# Patient Record
Sex: FEMALE | Race: WHITE | Employment: OTHER | URBAN - METROPOLITAN AREA
[De-identification: names, ages, dates, MRNs, and addresses within clinical notes are randomized per-mention and may not be internally consistent; named-entity substitution may affect disease eponyms.]

---

## 2017-01-05 ENCOUNTER — OFFICE VISIT (OUTPATIENT)
Dept: ENDOCRINOLOGY | Age: 77
End: 2017-01-05

## 2017-01-05 VITALS
DIASTOLIC BLOOD PRESSURE: 81 MMHG | HEART RATE: 75 BPM | SYSTOLIC BLOOD PRESSURE: 177 MMHG | WEIGHT: 162.4 LBS | HEIGHT: 61 IN | BODY MASS INDEX: 30.66 KG/M2

## 2017-01-05 DIAGNOSIS — E89.0 POSTABLATIVE HYPOTHYROIDISM: ICD-10-CM

## 2017-01-05 DIAGNOSIS — I10 ESSENTIAL HYPERTENSION, BENIGN: ICD-10-CM

## 2017-01-05 DIAGNOSIS — E78.5 HYPERLIPIDEMIA WITH TARGET LDL LESS THAN 100: ICD-10-CM

## 2017-01-05 RX ORDER — AMLODIPINE BESYLATE 10 MG/1
10 TABLET ORAL DAILY
Qty: 30 TAB | Refills: 11 | Status: SHIPPED | OUTPATIENT
Start: 2017-01-05

## 2017-01-05 RX ORDER — INSULIN ASPART 100 [IU]/ML
INJECTION, SOLUTION INTRAVENOUS; SUBCUTANEOUS
Qty: 5 PEN | Refills: 11 | Status: SHIPPED | OUTPATIENT
Start: 2017-01-05 | End: 2017-01-24 | Stop reason: SDUPTHER

## 2017-01-05 RX ORDER — INSULIN ASPART 100 [IU]/ML
INJECTION, SOLUTION INTRAVENOUS; SUBCUTANEOUS
Qty: 1 PEN | Refills: 0 | Status: SHIPPED | COMMUNITY
Start: 2017-01-05 | End: 2017-01-05 | Stop reason: SDUPTHER

## 2017-01-05 RX ORDER — PEN NEEDLE, DIABETIC 31 GX3/16"
NEEDLE, DISPOSABLE MISCELLANEOUS
Qty: 200 PEN NEEDLE | Refills: 11 | Status: SHIPPED | OUTPATIENT
Start: 2017-01-05 | End: 2017-10-20 | Stop reason: SDUPTHER

## 2017-01-05 RX ORDER — INSULIN ASPART 100 [IU]/ML
INJECTION, SOLUTION INTRAVENOUS; SUBCUTANEOUS
Qty: 1 PEN | Refills: 0 | Status: SHIPPED | COMMUNITY
Start: 2017-01-05 | End: 2017-01-24 | Stop reason: SDUPTHER

## 2017-01-05 NOTE — PROGRESS NOTES
Chief Complaint   Patient presents with    Diabetes     History of Present Illness: Riddhi Dunlap is a 68 y.o. female here for follow up of diabetes. Weight up 5 lbs since last visit in 12/16. Was taking the amlodipine everyday but ran out 2 days ago. Home BP readings have been in the 155-180 range despite taking the amlodipine everyday. Has had a few diastolic readings over 201 where she took the clonidine. Had one reading of 201/104 in the middle of the night. She has been compliant with the toujeo and novolog but despite taking these as directed, her fasting sugars are typically over 200. Can sometimes drop under 100 at lunch and feel shaky and sweaty. Tend to stay over 200 with some over 300 at dinner and bedtime. Current Outpatient Prescriptions   Medication Sig    cloNIDine HCl (CATAPRES) 0.1 mg tablet TAKE 1/2 TABLET BY MOUTH TWICE A DAY only if diastolic BP > 808    ALPRAZolam (XANAX) 0.25 mg tablet Take 1 tab at bedtime    levothyroxine (SYNTHROID) 112 mcg tablet Take 112 mcg by mouth daily. 1 tab twice daily    ACCU-CHEK SMARTVIEW TEST STRIP strip Test 3 times daily    clindamycin (CLEOCIN) 300 mg capsule take 2 capsules by mouth 1 HOUR BEFORE DENTAL PROCEDURE    amLODIPine (NORVASC) 5 mg tablet Take 5 mg by mouth daily.  insulin glargine (TOUJEO SOLOSTAR) 300 unit/mL (1.5 mL) inpn 20 Units by SubCUTAneous route nightly.  insulin aspart (NOVOLOG) 100 unit/mL inpn Inject 6 units before meals + 1 units for every 50 mg/dl above 180 mg/dl    metoprolol (LOPRESSOR) 50 mg tablet Take 50 mg by mouth two (2) times a day.  metformin (GLUCOPHAGE) 1,000 mg tablet Take 500 mg by mouth two (2) times a day. No current facility-administered medications for this visit.       Allergies   Allergen Reactions    Demerol [Meperidine] Hives    Penicillins Hives    Percocet [Oxycodone-Acetaminophen] Other (comments)     hallucinating    Simvastatin Nausea Only     Review of Systems: Not asked today    Physical Examination:  Blood pressure 177/81, pulse 75, height 5' 1\" (1.549 m), weight 162 lb 6.4 oz (73.7 kg). - General: pleasant, no distress, good eye contact   - Full exam not performed  - Psychiatric: normal mood and affect    Data Reviewed:   - none new for review    Assessment/Plan:     1. Uncontrolled type 2 diabetes mellitus without complication, with long-term current use of insulin ([address]): her most recent Hgb A1c was 10.6% in 12/16 up from 8.8% in 10/15. 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 We spent 40 minutes of face to face time together and > 50% of the time was spent in counseling on diabetes management and determining what changes to make to her insulin regimen. I think she needs more toujeo and slightly more novolog with meals and also needs a bedtime scale to help correct for highs. Reviewed all of these instructions with pt and her daughter.  - cont metformin 1g 1/2 tab bid  - increase toujeo 22 units at bedtime  - increase novolog to 7 units before each meal + 1 units for every 50 mg/dl above 180 mg/dl  - check bs 4 times daily due to fluctuating sugars  - foot exam done 12/16  - optho UTD 4/16--will obtain report       2. Essential hypertension, benign: her BP was above goal < 140/90 so will increase the amlodipine.  - increase amlodipine to 10 mg daily  - cont metoprolol 50 mg bid  - cont clonidine 0.1 mg 1/2 tab bid prn diastolic bp > 219      3. Hyperlipidemia with target LDL less than 100:  in 10/15 and 73 in 12/16  - diet control for now  - check lipids in 8 months     4. Postablative hypothyroidism: TSH was 0.91 in 10/15 on levothyroxine 112 mcg daily and 3.23 in 12/16  - cont levothyroxine 112 mcg daily  - check TSH and free T4 in 8 months          Patient Instructions   1) Increase toujeo (light gray/green pen) to 22 units at bedtime.   This is a long acting insulin. You will take this everyday regardless of your blood sugar. 2) Increase novolog (dark blue/orange pen) 5-10 minutes before you eat during the day your meal based on your blood sugar as listed on the scale below:  Blood sugar            Less than 100  Eat first, take 6 units    100-180  7 units    181-230  8 units      231-280  9 units     281-330  10 units      331-380  11 units      381-430  12 units  431-480  13 units  481-530  14 units    3) If you check your sugar at bedtime and your sugar is over 180, take the 22 units of toujeo PLUS a dose of novolog based on the scale below. 181-230  1 units      231-280  2 units     281-330  3 units      331-380  4 units      381-430  5 units  431-480  6 units  481-530  7 units    4) If you are following the scale for meals for novolog and are having more low sugars between breakfast and lunch, try taking 1 unit less than what's on the scale. 5) Fax me readings in 1 week to [phone].    6) Let me know when you are going to be coming back to [place] and we'll see if we can coordinate your visit. 7) I sent prescriptions for toujeo and novolog and pen needles to the pharmacy. There's a chance that your insurance requires different insulins but we'll address that if needed. Follow-up Disposition:  Return for to be determine upon when she comes back to see Dr. Reg Zamora.     Copy sent to:  Dr. Ryan Davis

## 2017-01-05 NOTE — PATIENT INSTRUCTIONS
1) Increase toujeo (light gray/green pen) to 22 units at bedtime. This is a long acting insulin. You will take this everyday regardless of your blood sugar. 2) Increase novolog (dark blue/orange pen) 5-10 minutes before you eat during the day your meal based on your blood sugar as listed on the scale below:  Blood sugar            Less than 100  Eat first, take 6 units    100-180  7 units    181-230  8 units      231-280  9 units     281-330  10 units      331-380  11 units      381-430  12 units  431-480  13 units  481-530  14 units    3) If you check your sugar at bedtime and your sugar is over 180, take the 22 units of toujeo PLUS a dose of novolog based on the scale below. 181-230  1 units      231-280  2 units     281-330  3 units      331-380  4 units      381-430  5 units  431-480  6 units  481-530  7 units    4) If you are following the scale for meals for novolog and are having more low sugars between breakfast and lunch, try taking 1 unit less than what's on the scale. 5) Fax me readings in 1 week to [phone].    6) Let me know when you are going to be coming back to [facility] and we'll see if we can coordinate your visit. 7) I sent prescriptions for toujeo and novolog and pen needles to the pharmacy. There's a chance that your insurance requires different insulins but we'll address that if needed.

## 2017-01-05 NOTE — MR AVS SNAPSHOT
Visit Information Date & Time Provider Department Dept. Phone Encounter #  
 1/5/2017 12:30 PM Javier Orantes, [address] [facility] Diabetes and Endocrinology [id number] Follow-up Instructions Return for to be determine upon when she comes back to see Dr. Ele Montanez. Upcoming Health Maintenance Date Due MICROALBUMIN Q1 7/4/1950 EYE EXAM RETINAL OR DILATED Q1 7/4/1950 DTaP/Tdap/Td series (1 - Tdap) 7/4/1961 ZOSTER VACCINE AGE 60> 7/4/2000 GLAUCOMA SCREENING Q2Y 7/4/2005 OSTEOPOROSIS SCREENING (DEXA) 7/4/2005 MEDICARE YEARLY EXAM 7/4/2005 Pneumococcal 65+ Low/Medium Risk (2 of 2 - PPSV23) 9/2/2015 INFLUENZA AGE 9 TO ADULT 8/1/2016 HEMOGLOBIN A1C Q6M 6/8/2017 FOOT EXAM Q1 12/8/2017 LIPID PANEL Q1 12/8/2017 Allergies as of 1/5/2017  Review Complete On: 1/5/2017 By: Javier Orantes MD  
  
 Severity Noted Reaction Type Reactions Demerol [Meperidine] Medium 09/02/2010   Topical Hives Penicillins Medium 09/02/2010   Topical Hives Percocet [Oxycodone-acetaminophen]  12/08/2016    Other (comments)  
 hallucinating Simvastatin  01/05/2011    Nausea Only Current Immunizations  Never Reviewed Name Date Pneumococcal Vaccine (Unspecified Type) 9/2/2010  3:41 PM  
  
 Not reviewed this visit Vitals BP Pulse Height(growth percentile) Weight(growth percentile) BMI Smoking Status 177/81 (BP 1 Location: Left arm, BP Patient Position: Sitting) 75 5' 1\" (1.549 m) 162 lb 6.4 oz (73.7 kg) 30.69 kg/m2 Former Smoker BMI and BSA Data Body Mass Index Body Surface Area  
 30.69 kg/m 2 1.78 m 2 Preferred Pharmacy Pharm[facility] Name Phone [facility] [phone] [facility] [address] [phone] Your Updated Medication List  
  
   
This list is accurate as of: 1/5/17 12:56 PM.  Always use your most recent med list.  
  
  
  
  
 ACCU-CHEK SMARTVIEW TEST STRIP strip Generic drug:  glucose blood VI test strips Test 3 times daily ALPRAZolam 0.25 mg tablet Commonly known as:  Jedard Lennox Take 1 tab at bedtime  
  
 amLODIPine 5 mg tablet Commonly known as:  Byron Jimena Take 5 mg by mouth daily. clindamycin 300 mg capsule Commonly known as:  CLEOCIN  
take 2 capsules by mouth 1 HOUR BEFORE DENTAL PROCEDURE  
  
 cloNIDine HCl 0.1 mg tablet Commonly known as:  CATAPRES  
TAKE 1/2 TABLET BY MOUTH TWICE A DAY only if diastolic BP > 518  
  
 * insulin aspart 100 unit/mL Inpn Commonly known as:  Vanessa Listen Inject 7 units before meals + 1 units for every 50 mg/dl above 180 mg/dl--may substitute humalog if preferred * insulin aspart 100 unit/mL Inpn Commonly known as:  NOVOLOG  
sample  
  
 insulin glargine 300 unit/mL (1.5 mL) Inpn Commonly known as:  TOUJEO SOLOSTAR  
22 Units by SubCUTAneous route nightly. Insulin Needles (Disposable) 31 gauge x 3/16\" Ndle Commonly known as:  BD INSULIN PEN NEEDLE UF MINI Use as directed up to 6 times daily  
  
 levothyroxine 112 mcg tablet Commonly known as:  SYNTHROID Take 112 mcg by mouth daily. 1 tab twice daily  
  
 metFORMIN 1,000 mg tablet Commonly known as:  GLUCOPHAGE Take 500 mg by mouth two (2) times a day. metoprolol tartrate 50 mg tablet Commonly known as:  LOPRESSOR Take 50 mg by mouth two (2) times a day. * Notice: This list has 2 medication(s) that are the same as other medications prescribed for you. Read the directions carefully, and ask your doctor or other care provider to review them with you. Prescriptions Sent to Pharmacy Refills  
 insulin glargine (TOUJEO SOLOSTAR) 300 unit/mL (1.5 mL) inpn 11 Si Units by SubCUTAneous route nightly. Class: Normal  
 Pharmacy: [place] [address] [address] #: [phone] Route: SubCUTAneous  Insulin Needles, Disposable, (BD INSULIN PEN NEEDLE UF MINI) 31 gauge x 3/16\" ndle 11 Sig: Use as directed up to 6 times daily Class: Normal  
 [facility] [place] [address] [facility] [address]  #: [phone]  
 insulin aspart (NOVOLOG) 100 unit/mL inpn 11 Sig: Inject 7 units before meals + 1 units for every 50 mg/dl above 180 mg/dl--may substitute humalog if preferred Class: Normal  
 [facility] [id number] [facility] Ph #: [phone] Follow-up Instructions Return for to be determine upon when she comes back to see Dr. Adan Colunga. Patient Instructions 1) Increase toujeo (light gray/green pen) to 22 units at bedtime. This is a long acting insulin. You will take this everyday regardless of your blood sugar. 2) Increase novolog (dark blue/orange pen) 5-10 minutes before you eat during the day your meal based on your blood sugar as listed on the scale below: 
Blood sugar Less than 100  Eat first, take 6 units 100-180  7 units 181-230  8 units 231-280  9 units 281-330  10 units 331-380  11 units 381-430  12 units 431-480  13 units 481-530  14 units 3) If you check your sugar at bedtime and your sugar is over 180, take the 22 units of toujeo PLUS a dose of novolog based on the scale below. 181-230  1 units 231-280  2 units 281-330  3 units 331-380  4 units 381-430  5 units 431-480  6 units 481-530  7 units 4) If you are following the scale for meals for novolog and are having more low sugars between breakfast and lunch, try taking 1 unit less than what's on the scale. 5) Fax me readings in 1 week to [phone]. 
 
6) Let me know when you are going to be coming back to [place] and we'll see if we can coordinate your visit. 7) I sent prescriptions for toujeo and novolog and pen needles to the pharmacy. There's a chance that your insurance requires different insulins but we'll address that if needed. Introducing [facility] & HE[facility]! Leonel Cohen introduces [employer] patient portal. Now you can access parts of your medical record, email your doctor's office, and request medication refills online. 1. In your internet browser, go to [email or web address] 2. Click on the First Time User? Click Here link in the Sign In box. You will see the New Member Sign Up page. 3. Enter your [employer] Access Code exactly as it appears below. You will not need to use this code after youve completed the sign-up process. If you do not sign up before the expiration date, you must request a new code. · [employer] Access Code: [email or web address] Expires: 3/8/2017  1:22 PM 
 
4. Enter the last four digits of your Social Security Number (xxxx) and Date of Birth (mm/dd/yyyy) as indicated and click Submit. You will be taken to the next sign-up page. 5. Create a [employer] ID. This will be your [employer] login ID and cannot be changed, so think of one that is secure and easy to remember. 6. Create a [employer] password. You can change your password at any time. 7. Enter your Password Reset Question and Answer. This can be used at a later time if you forget your password. 8. Enter your e-mail address. You will receive e-mail notification when new information is available in [address] 9. Click Sign Up. You can now view and download portions of your medical record. 10. Click the Download Summary menu link to download a portable copy of your medical information. If you have questions, please visit the Frequently Asked Questions section of the [employer] website. Remember, [employer] is NOT to be used for urgent needs. For medical emergencies, dial 911. Now available from your iPhone and Android! Please provide this summary of care documentation to your next provider. Your primary care clinician is listed as Rand Kent. If you have any questions after today's visit, please call [phone].

## 2017-01-06 ENCOUNTER — TELEPHONE (OUTPATIENT)
Dept: ENDOCRINOLOGY | Age: 77
End: 2017-01-06

## 2017-01-11 ENCOUNTER — TELEPHONE (OUTPATIENT)
Dept: ENDOCRINOLOGY | Age: 77
End: 2017-01-11

## 2017-01-16 ENCOUNTER — TELEPHONE (OUTPATIENT)
Dept: ENDOCRINOLOGY | Age: 77
End: 2017-01-16

## 2017-01-16 NOTE — TELEPHONE ENCOUNTER
----- Message from Lena Kahn MD sent at 1/15/2017  9:08 AM EST -----  Please call her and let her know I received her fax with her blood sugars. They are improving but she is still having frequent spikes over 200. I would like her to increase her toujeo to 25 units at night and keep her doses of novolog the same and give me another update in one week.  -----    Pt notified of above note per Dr. Susan Mosquera and voiced understanding of what was read to her.

## 2017-01-24 ENCOUNTER — TELEPHONE (OUTPATIENT)
Dept: ENDOCRINOLOGY | Age: 77
End: 2017-01-24

## 2017-01-24 RX ORDER — INSULIN ASPART 100 [IU]/ML
INJECTION, SOLUTION INTRAVENOUS; SUBCUTANEOUS
Qty: 5 PEN | Refills: 11
Start: 2017-01-24 | End: 2018-01-17 | Stop reason: SDUPTHER

## 2017-01-24 NOTE — TELEPHONE ENCOUNTER
----- Message from Basia Burnette MD sent at 1/19/2017 10:06 PM EST -----  Let her know I received her fax with her sugars and her readings are still running too high. Have her increase her toujeo to 28 units at bedtime and also increase her novolog dose by 1 unit from what is listed on her scale for the before eating scale and give me an update over fax in one week. ---     Received: 5 days ago       Basia Burnette MD  Isaias Falconarch                     Let her know I received her fax with her sugars and her readings are still running too high.  Have her increase her toujeo to 28 units at bedtime and also increase her novolog dose by 1 unit from what is listed on her scale for the before eating scale and give me an update over fax in one week.              Comments        1/20 lmtrc x1 with sister. 1/20 returned patient call     Pt notified of above note per Dr. Angela Cifuentes and voiced understanding of what was read to her.

## 2017-02-02 ENCOUNTER — TELEPHONE (OUTPATIENT)
Dept: ENDOCRINOLOGY | Age: 77
End: 2017-02-02

## 2017-02-02 NOTE — TELEPHONE ENCOUNTER
She did this exactly correctly and she should never hold the toujeo. If her sugar is under 100, she should drink 4 oz of juice or milk and repeat 15-20 minutes later to ensure it's over 100 and then go ahead and take the toujeo.

## 2017-02-02 NOTE — TELEPHONE ENCOUNTER
Comments        1/30 called pt no answer and no voicemail came on.         ----     I called patient and she voiced understanding however she stated that last night at 8:44 pm her sugar dropped to 74. She stated she ate some crackers, peanut butter, milk and peppermint to bring it up. She stated she rechecked it at 9:19 pm and it was 138. Patient then took her 28 units of Toujeo. She would like to know was that the right thing to do and if it happens again she still take the toujeo. I also told patient that I tried to reach her but no voicemail came on. She stated that her machine was off but she turned it on while I was on the phone with her.

## 2017-02-02 NOTE — TELEPHONE ENCOUNTER
----- Message from Kylie Dos Santos MD sent at 1/29/2017 12:51 PM EST -----  Please let her know I received her fax and her sugars look a lot better on 28 of toujeo and the extra 1 unit of novolog from what her current scale says. Now that her sugars are staying under 200, she won't need to keep faxing me readings and she can just bring a list of her sugars to her next visit in April.

## 2017-02-09 ENCOUNTER — TELEPHONE (OUTPATIENT)
Dept: ENDOCRINOLOGY | Age: 77
End: 2017-02-09

## 2017-02-09 NOTE — TELEPHONE ENCOUNTER
Yes.  Please follow the new novolog scale but just take the decreased dose of toujeo 26 units at night.

## 2017-02-09 NOTE — TELEPHONE ENCOUNTER
Pt notified of message per Dr. Tim Gan and voiced understanding of what was read to her. FYI. .  Pt stated that before lunch her sugar dropped to 65 but after she ate it went up to 105. She would like to know if she should still keep the novolog if she should need it.

## 2017-02-09 NOTE — TELEPHONE ENCOUNTER
Please clarify what she is doing with the novolog as I have not stopped this. If her sugar is under 100, she is supposed to eat first and take 1 unit less novolog. Hopefully the lower dose of toujeo will limit the low sugars she has been having.

## 2017-02-09 NOTE — TELEPHONE ENCOUNTER
Clarification:   Patient stated that she is taking the Novolog as directed  however she wanted to know if she should should still follow the new scale that gave her the increase.

## 2017-02-09 NOTE — TELEPHONE ENCOUNTER
----- Message from Harry Litten sent at 2/9/2017 10:54 AM EST -----  Regarding: Patient call  Patient stated that she would like a call back. It is regarding her sugar dropping in the mornings. This morning it was 82 and patient stated that she started shaking. She can be reached at either number [phone] or [phone]. Thank you.        Yudith Thorpe

## 2017-02-09 NOTE — TELEPHONE ENCOUNTER
Pt stated that for the past 3 mornings her sugar has been low in the am which causes her to shake. She stated that her numbers were 72,80 and this am it was 82. Patient stated she drank some milk and ate something and her sugar  goes back up. She stated that her sugar's have been okay with the highest reading being 174. Pt stated that she eats dinner at 5:30 pm and sometimes a snack consisting of celery and crackers.

## 2017-02-09 NOTE — TELEPHONE ENCOUNTER
Have her decrease her toujeo to 26 units starting tonight to see if this helps keep her morning sugars between  and update me in 1 week if this is not the case.

## 2017-04-18 ENCOUNTER — OFFICE VISIT (OUTPATIENT)
Dept: ENDOCRINOLOGY | Age: 77
End: 2017-04-18

## 2017-04-18 VITALS
HEIGHT: 61 IN | WEIGHT: 156.6 LBS | DIASTOLIC BLOOD PRESSURE: 98 MMHG | HEART RATE: 71 BPM | SYSTOLIC BLOOD PRESSURE: 181 MMHG | BODY MASS INDEX: 29.57 KG/M2

## 2017-04-18 DIAGNOSIS — E89.0 POSTABLATIVE HYPOTHYROIDISM: ICD-10-CM

## 2017-04-18 DIAGNOSIS — I10 ESSENTIAL HYPERTENSION, BENIGN: ICD-10-CM

## 2017-04-18 DIAGNOSIS — E78.5 HYPERLIPIDEMIA WITH TARGET LDL LESS THAN 100: ICD-10-CM

## 2017-04-18 LAB — HBA1C MFR BLD HPLC: 9 %

## 2017-04-18 NOTE — PROGRESS NOTES
Chief Complaint   Patient presents with    Diabetes     pcp and pharmacy confirmed     History of Present Illness: Amna Perez is a 68 y.o. female here for follow up of diabetes. Weight down 6 lbs since last visit in 1/17. Has been taking 26 units of toujeo since 2/9/17 along with the novolog scale. Saw Dr. Donavon Spatz this morning and her cancer is improving and doesn't need any other treatment at this time. Her sugars had been doing better with the majority of her readings under 200 when we last spoke with her in the beginning of 2/17 but since then has had much higher readings consistently in the 200s with some over 300s the month of March. Admits that a lot of this is due to eating higher carb foods and some is due to not taking her novolog when she eats as she doesn't bring her pen and her meter with her when she is away from home. Current Outpatient Prescriptions   Medication Sig    insulin glargine (TOUJEO SOLOSTAR) 300 unit/mL (1.5 mL) inpn 26 Units by SubCUTAneous route nightly. Dose change 2/9/17--updated med list--did not send prescription to the pharmacy    insulin aspart (NOVOLOG) 100 unit/mL inpn Inject 8 units before meals + 1 units for every 50 mg/dl above 180 mg/dl--may substitute humalog if preferred--Dose change 1/24/17--updated med list--did not send prescription to the pharmacy    Insulin Needles, Disposable, (BD INSULIN PEN NEEDLE UF MINI) 31 gauge x 3/16\" ndle Use as directed up to 6 times daily    amLODIPine (NORVASC) 10 mg tablet Take 1 Tab by mouth daily.  cloNIDine HCl (CATAPRES) 0.1 mg tablet TAKE 1/2 TABLET BY MOUTH TWICE A DAY only if diastolic BP > 246    ALPRAZolam (XANAX) 0.25 mg tablet Take 1 tab at bedtime    levothyroxine (SYNTHROID) 112 mcg tablet Take 112 mcg by mouth daily. 1 tab twice daily    ACCU-CHEK SMARTVIEW TEST STRIP strip Test 3 times daily    metoprolol (LOPRESSOR) 50 mg tablet Take 50 mg by mouth two (2) times a day.     metformin (GLUCOPHAGE) 1,000 mg tablet Take 500 mg by mouth two (2) times a day.  clindamycin (CLEOCIN) 300 mg capsule take 2 capsules by mouth 1 HOUR BEFORE DENTAL PROCEDURE     No current facility-administered medications for this visit. Allergies   Allergen Reactions    Demerol [Meperidine] Hives    Penicillins Hives    Percocet [Oxycodone-Acetaminophen] Other (comments)     hallucinating    Simvastatin Nausea Only     Review of Systems: Not asked today    Physical Examination:  Blood pressure (!) 181/98, pulse 71, height 5' 1\" (1.549 m), weight 156 lb 9.6 oz (71 kg). - General: pleasant, no distress, good eye contact   - Full exam not performed  - Psychiatric: normal mood and affect    Data Reviewed:   Component      Latest Ref Rng & Units 4/18/2017           5:05 PM   Hemoglobin A1c (POC)      % 9.0       Assessment/Plan:     1. Uncontrolled type 2 diabetes mellitus without complication, with long-term current use of insulin ([facility] [address]): her most recent Hgb A1c was 9% in 4/17 down from 10.6% in 12/16 up from 8.8% in 10/15. 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 We spent 25 minutes of face to face time together and > 50% of the time was spent in counseling on diabetes management and determining what changes to make to her insulin regimen. She has evidence that her sugars run well when taking all of her insulin as directed and with not overdoing it on carbs. Will not make changes to her doses but did give her a plan of what to do if she eats away from home and doesn't dose her insulin before the meals. - cont metformin 1g 1/2 tab bid  - cont toujeo 26 units at bedtime  - cont novolog 8 units before each meal + 1 units for every 50 mg/dl above 180 mg/dl  - check bs 4 times daily due to fluctuating sugars  - foot exam done 12/16  - optho UTD 3/17      2.  Essential hypertension, benign: her BP was above goal < 140/90 but didn't repeat manually. BP was 140/84 last month at Dr. Po Dejesus office per review of his note remotely through our shared EMR. - cont amlodipine 10 mg daily  - cont metoprolol 50 mg bid  - cont clonidine 0.1 mg 1/2 tab bid prn diastolic bp > 569      3. Hyperlipidemia with target LDL less than 100:  in 10/15 and 73 in 12/16  - diet control for now  - check lipids at next visit     4. Postablative hypothyroidism: TSH was 0.91 in 10/15 on levothyroxine 112 mcg daily and 3.23 in 12/16  - cont levothyroxine 112 mcg daily  - check TSH and free T4 at next visit          Patient Instructions   1) Continue toujeo (light gray/green pen) 26 units at bedtime. This is a long acting insulin. You will take this everyday regardless of your blood sugar. 2) Take novolog (dark blue/orange pen) 5-10 minutes before you eat during the day your meal based on your blood sugar as listed on the scale below:  I updated this scale so you don't need to take more than what's on this scale. Blood sugar            Less than 100  Eat first, take 7 units    100-180  8 units    181-230  9 units      231-280  10 units     281-330  11 units      331-380  12 units      381-430  13 units  431-480  14 units  481-530  15 units    3) If you check your sugar at bedtime and your sugar is over 180, take the 26 units of toujeo PLUS a dose of novolog based on the scale below. 181-230  1 units      231-280  2 units     281-330  3 units      331-380  4 units      381-430  5 units  431-480  6 units  481-530  7 units    4) If you don't take the novolog before you eat, and it's been within 2 hours of the meal, check your sugar when you get home and take a dose of novolog based on the EATING scale but TAKE 2 UNITS LESS. If it's time for the next meal, just take your dose based on what the scale says.     5) Your Hemoglobin A1c (3 month test of blood sugar) was 9% down from 10.6% and likely would have been lower had your sugars not been so high from March. 6) Let me know when you are seeing Dr. Oscar Ponce next and we'll coordinate your visit. Follow-up Disposition:  Return for based on when you see Dr. Oscar borden.     Copy sent to:  Dr. Carlos Kumar

## 2017-04-18 NOTE — MR AVS SNAPSHOT
Visit Information Date & Time Provider Department Dept. Phone Encounter #  
 4/18/2017  4:30 PM Alesha Currie, [address] [place] [address] Diabetes and Endocrinology [phone] [id number] Follow-up Instructions Return for based on when you see Dr. Jerri Suarez next. Upcoming Health Maintenance Date Due MICROALBUMIN Q1 7/4/1950 DTaP/Tdap/Td series (1 - Tdap) 7/4/1961 ZOSTER VACCINE AGE 60> 7/4/2000 OSTEOPOROSIS SCREENING (DEXA) 7/4/2005 MEDICARE YEARLY EXAM 7/4/2005 Pneumococcal 65+ Low/Medium Risk (2 of 2 - PPSV23) 9/2/2015 INFLUENZA AGE 9 TO ADULT 8/1/2016 HEMOGLOBIN A1C Q6M 6/8/2017 FOOT EXAM Q1 12/8/2017 LIPID PANEL Q1 12/8/2017 EYE EXAM RETINAL OR DILATED Q1 3/23/2018 GLAUCOMA SCREENING Q2Y 3/23/2019 Allergies as of 4/18/2017  Review Complete On: 4/18/2017 By: Alesha Currie MD  
  
 Severity Noted Reaction Type Reactions Demerol [Meperidine] Medium 09/02/2010   Topical Hives Penicillins Medium 09/02/2010   Topical Hives Percocet [Oxycodone-acetaminophen]  12/08/2016    Other (comments)  
 hallucinating Simvastatin  01/05/2011    Nausea Only Current Immunizations  Never Reviewed Name Date Pneumococcal Vaccine (Unspecified Type) 9/2/2010  3:41 PM  
  
 Not reviewed this visit You Were Diagnosed With   
  
 Codes Comments Uncontrolled type 2 diabetes mellitus without complication, with long-term current use of insulin [facility]    -  Primary ICD-10-CM: E11.65, Z79.4 ICD-9-CM: 250.02, V58.67 Vitals BP Pulse Height(growth percentile) Weight(growth percentile) BMI Smoking Status (!) 181/98 (BP 1 Location: Right arm, BP Patient Position: Sitting) 71 5' 1\" (1.549 m) 156 lb 9.6 oz (71 kg) 29.59 kg/m2 Former Smoker BMI and BSA Data Body Mass Index Body Surface Area  
 29.59 kg/m 2 1.75 m 2 Preferred Pharmacy Pharmacy Name Phone  [facility] [phone] Your Updated Medication List  
  
   
This list is accurate as of: 4/18/17  5:49 PM.  Always use your most recent med list.  
  
  
  
  
 ACCU-CHEK SMARTVIEW TEST STRIP strip Generic drug:  glucose blood VI test strips Test 3 times daily ALPRAZolam 0.25 mg tablet Commonly known as:  Melody Dials Take 1 tab at bedtime  
  
 amLODIPine 10 mg tablet Commonly known as:  [place]radle Take 1 Tab by mouth daily. clindamycin 300 mg capsule Commonly known as:  CLEOCIN  
take 2 capsules by mouth 1 HOUR BEFORE DENTAL PROCEDURE  
  
 cloNIDine HCl 0.1 mg tablet Commonly known as:  CATAPRES  
TAKE 1/2 TABLET BY MOUTH TWICE A DAY only if diastolic BP > 107  
  
 insulin aspart 100 unit/mL Inpn Commonly known as:  Guerrero Fus Inject 8 units before meals + 1 units for every 50 mg/dl above 180 mg/dl--may substitute humalog if preferred--Dose change 1/24/17--updated med list--did not send prescription to the pharmacy  
  
 insulin glargine 300 unit/mL (1.5 mL) Inpn Commonly known as:  TOUJEO SOLOSTAR  
26 Units by SubCUTAneous route nightly. Dose change 2/9/17--updated med list--did not send prescription to the pharmacy Insulin Needles (Disposable) 31 gauge x 3/16\" Ndle Commonly known as:  BD INSULIN PEN NEEDLE UF MINI Use as directed up to 6 times daily  
  
 levothyroxine 112 mcg tablet Commonly known as:  SYNTHROID Take 112 mcg by mouth daily. 1 tab twice daily  
  
 metFORMIN 1,000 mg tablet Commonly known as:  GLUCOPHAGE Take 500 mg by mouth two (2) times a day. metoprolol tartrate 50 mg tablet Commonly known as:  LOPRESSOR Take 50 mg by mouth two (2) times a day. We Performed the Following AMB POC HEMOGLOBIN A1C [71081 CPT(R)] Follow-up Instructions Return for based on when you see Dr. Michelle Brown next. Patient Instructions 1) Continue toujeo (light gray/green pen) 26 units at bedtime.   This is a long acting insulin. You will take this everyday regardless of your blood sugar. 2) Take novolog (dark blue/orange pen) 5-10 minutes before you eat during the day your meal based on your blood sugar as listed on the scale below:  I updated this scale so you don't need to take more than what's on this scale. Blood sugar Less than 100  Eat first, take 7 units 100-180  8 units 181-230  9 units 231-280  10 units 281-330  11 units 331-380  12 units 381-430  13 units 431-480  14 units 481-530  15 units 3) If you check your sugar at bedtime and your sugar is over 180, take the 26 units of toujeo PLUS a dose of novolog based on the scale below. 181-230  1 units 231-280  2 units 281-330  3 units 331-380  4 units 381-430  5 units 431-480  6 units 481-530  7 units 4) If you don't take the novolog before you eat, and it's been within 2 hours of the meal, check your sugar when you get home and take a dose of novolog based on the EATING scale but TAKE 2 UNITS LESS. If it's time for the next meal, just take your dose based on what the scale says. 5) Your Hemoglobin A1c (3 month test of blood sugar) was 9% down from 10.6% and likely would have been lower had your sugars not been so high from March. 6) Let me know when you are seeing Dr. Deisy Szymanski next and we'll coordinate your visit. Introducing hospitals & HEALTH SERVICES! Heike Gates introduces [employer] patient portal. Now you can access parts of your medical record, email your doctor's office, and request medication refills online. 1. In your internet browser, go to [email or web address] 2. Click on the First Time User? Click Here link in the Sign In box. You will see the New Member Sign Up page. 3. Enter your [employer] Access Code exactly as it appears below. You will not need to use this code after youve completed the sign-up process.  If you do not sign up before the expiration date, you must request a new code. · [employer] Access Code: [email or web address] Expires: 7/17/2017  5:43 PM 
 
4. Enter the last four digits of your Social Security Number (xxxx) and Date of Birth (mm/dd/yyyy) as indicated and click Submit. You will be taken to the next sign-up page. 5. Create a [employer] ID. This will be your [employer] login ID and cannot be changed, so think of one that is secure and easy to remember. 6. Create a [employer] password. You can change your password at any time. 7. Enter your Password Reset Question and Answer. This can be used at a later time if you forget your password. 8. Enter your e-mail address. You will receive e-mail notification when new information is available in [address] 9. Click Sign Up. You can now view and download portions of your medical record. 10. Click the Download Summary menu link to download a portable copy of your medical information. If you have questions, please visit the Frequently Asked Questions section of the [employer] website. Remember, [employer] is NOT to be used for urgent needs. For medical emergencies, dial 911. Now available from your iPhone and Android! Please provide this summary of care documentation to your next provider. Your primary care clinician is listed as [place]. If you have any questions after today's visit, please call [phone].

## 2017-04-18 NOTE — PATIENT INSTRUCTIONS
1) Continue toujeo (light gray/green pen) 26 units at bedtime. This is a long acting insulin. You will take this everyday regardless of your blood sugar. 2) Take novolog (dark blue/orange pen) 5-10 minutes before you eat during the day your meal based on your blood sugar as listed on the scale below:  I updated this scale so you don't need to take more than what's on this scale. Blood sugar            Less than 100  Eat first, take 7 units    100-180  8 units    181-230  9 units      231-280  10 units     281-330  11 units      331-380  12 units      381-430  13 units  431-480  14 units  481-530  15 units    3) If you check your sugar at bedtime and your sugar is over 180, take the 26 units of toujeo PLUS a dose of novolog based on the scale below. 181-230  1 units      231-280  2 units     281-330  3 units      331-380  4 units      381-430  5 units  431-480  6 units  481-530  7 units    4) If you don't take the novolog before you eat, and it's been within 2 hours of the meal, check your sugar when you get home and take a dose of novolog based on the EATING scale but TAKE 2 UNITS LESS. If it's time for the next meal, just take your dose based on what the scale says. 5) Your Hemoglobin A1c (3 month test of blood sugar) was 9% down from 10.6% and likely would have been lower had your sugars not been so high from March. 6) Let me know when you are seeing Dr. Mia Fernandes next and we'll coordinate your visit.

## 2017-04-25 ENCOUNTER — TELEPHONE (OUTPATIENT)
Dept: ENDOCRINOLOGY | Age: 77
End: 2017-04-25

## 2017-04-26 NOTE — TELEPHONE ENCOUNTER
Called patient again. She has changed her mind and will come on July 11, 2017 at 12:10 PM.   Appointment scheduled in [place].

## 2017-04-26 NOTE — TELEPHONE ENCOUNTER
Spoke with patient this morning and offered her 12:10 PM on  July 11th. She is unable to make that because she is having blood work done at 10:00 AM and then a CT Scan at 10:30 AM that morning. She said she could come on a different day. She can be reached at:  [phone].

## 2017-04-26 NOTE — TELEPHONE ENCOUNTER
----- Message from Pablo Chavez MD sent at 4/25/2017  9:29 PM EDT -----  Please see if she can come at 12:10pm that day as I already have a patient at 4:30 and don't have any other slots available.  ----- Message -----     From: [employer]     Sent: 4/25/2017   2:05 PM       To: Pablo Chavez MD    Patient is seeing Dr. Florida Durand on July 11, 2017 at 2:00 PM in the [facility].        ----- Message -----     From: Pablo Chavez MD     Sent: 4/25/2017   8:54 AM       To: [employer]    Can you call her and find out when she is coming to see Dr. Florida Durand again so we can coordinate her next visit with me.  ----- Message -----     From: Pablo Chavez MD     Sent: 4/25/2017       To: Pablo Chavez MD    Make sure she has called about when appt is with Dr. Florida Durand to try and coordinate this

## 2017-07-18 ENCOUNTER — OFFICE VISIT (OUTPATIENT)
Dept: ENDOCRINOLOGY | Age: 77
End: 2017-07-18

## 2017-07-18 VITALS
BODY MASS INDEX: 29.11 KG/M2 | HEART RATE: 68 BPM | HEIGHT: 61 IN | DIASTOLIC BLOOD PRESSURE: 80 MMHG | SYSTOLIC BLOOD PRESSURE: 140 MMHG | WEIGHT: 154.2 LBS

## 2017-07-18 DIAGNOSIS — E78.5 HYPERLIPIDEMIA WITH TARGET LDL LESS THAN 100: ICD-10-CM

## 2017-07-18 DIAGNOSIS — E89.0 POSTABLATIVE HYPOTHYROIDISM: ICD-10-CM

## 2017-07-18 DIAGNOSIS — I10 ESSENTIAL HYPERTENSION, BENIGN: ICD-10-CM

## 2017-07-18 NOTE — MR AVS SNAPSHOT
Visit Information Date & Time Provider Department Dept. Phone Encounter #  
 7/18/2017 10:10 AM Eli Vazquez MD [place] [facility]  Follow-up Instructions Return for 11/28/17 at 9:30am. Upcoming Health Maintenance Date Due MICROALBUMIN Q1 7/4/1950 DTaP/Tdap/Td series (1 - Tdap) 7/4/1961 ZOSTER VACCINE AGE 60> 7/4/2000 OSTEOPOROSIS SCREENING (DEXA) 7/4/2005 MEDICARE YEARLY EXAM 7/4/2005 Pneumococcal 65+ Low/Medium Risk (2 of 2 - PPSV23) 9/2/2015 INFLUENZA AGE 9 TO ADULT 8/1/2017 HEMOGLOBIN A1C Q6M 10/18/2017 FOOT EXAM Q1 12/8/2017 LIPID PANEL Q1 12/8/2017 EYE EXAM RETINAL OR DILATED Q1 3/23/2018 GLAUCOMA SCREENING Q2Y 3/23/2019 Allergies as of 7/18/2017  Review Complete On: 7/18/2017 By: Eli Vazquez MD  
  
 Severity Noted Reaction Type Reactions Demerol [Meperidine] Medium 09/02/2010   Topical Hives Penicillins Medium 09/02/2010   Topical Hives Percocet [Oxycodone-acetaminophen]  12/08/2016    Other (comments)  
 hallucinating Simvastatin  01/05/2011    Nausea Only Current Immunizations  Never Reviewed Name Date Pneumococcal Vaccine (Unspecified Type) 9/2/2010  3:41 PM  
  
 Not reviewed this visit You Were Diagnosed With   
  
 Codes Comments Uncontrolled type 2 diabetes mellitus without complication, with long-term current use of insulin ([facility]    -  Primary ICD-10-CM: E11.65, Z79.4 ICD-9-CM: 250.02, V58.67 Postablative hypothyroidism     ICD-10-CM: E89.0 ICD-9-CM: 244.1 Essential hypertension, benign     ICD-10-CM: I10 
ICD-9-CM: 401.1 Hyperlipidemia with target LDL less than 100     ICD-10-CM: E78.5 ICD-9-CM: 272.4 Vitals BP Pulse Height(growth percentile) Weight(growth percentile) BMI Smoking Status 140/80 68 5' 1\" (1.549 m) 154 lb 3.2 oz (69.9 kg) 29.14 kg/m2 Former Smoker Vitals History BMI and BSA Data Body Mass Index Body Surface Area  
 29.14 kg/m 2 1.73 m 2 Preferred Pharmacy Pharmacy Name Phone [facility] [phone] [phone] Your Updated Medication List  
  
   
This list is accurate as of: 7/18/17 10:51 AM.  Always use your most recent med list.  
  
  
  
  
 ACCU-CHEK SMARTVIEW TEST STRIP strip Generic drug:  glucose blood VI test strips Test 3 times daily ALPRAZolam 0.25 mg tablet Commonly known as:  [place] Crater Take 1 tab at bedtime  
  
 amLODIPine 10 mg tablet Commonly known as:  Juan Marcos Take 1 Tab by mouth daily. clindamycin 300 mg capsule Commonly known as:  CLEOCIN  
take 2 capsules by mouth 1 HOUR BEFORE DENTAL PROCEDURE  
  
 cloNIDine HCl 0.1 mg tablet Commonly known as:  CATAPRES  
TAKE 1/2 TABLET BY MOUTH TWICE A DAY only if diastolic BP > 220  
  
 insulin aspart 100 unit/mL Inpn Commonly known as:  Clarise Ly Inject 8 units before meals + 1 units for every 50 mg/dl above 180 mg/dl--may substitute humalog if preferred--Dose change 1/24/17--updated med list--did not send prescription to the pharmacy  
  
 insulin glargine 300 unit/mL (1.5 mL) Inpn Commonly known as:  TOUJEO SOLOSTAR  
26 Units by SubCUTAneous route nightly. Dose change 2/9/17--updated med list--did not send prescription to the pharmacy Insulin Needles (Disposable) 31 gauge x 3/16\" Ndle Commonly known as:  BD INSULIN PEN NEEDLE UF MINI Use as directed up to 6 times daily  
  
 levothyroxine 112 mcg tablet Commonly known as:  SYNTHROID Take 112 mcg by mouth daily. 1 tab twice daily  
  
 metFORMIN 1,000 mg tablet Commonly known as:  GLUCOPHAGE Take 500 mg by mouth two (2) times a day. metoprolol tartrate 50 mg tablet Commonly known as:  LOPRESSOR Take 50 mg by mouth two (2) times a day. OCTREOTIDE ACETATE INJECTION  
by Injection route every month. Through Dr. Rodriguez Sanchez office starting 7/17 We Performed the Following HEMOGLOBIN A1C WITH EAG [33380 CPT(R)] LIPID PANEL [76453 CPT(R)] METABOLIC PANEL, COMPREHENSIVE [23678 CPT(R)] MICROALBUMIN, UR, RAND W/ MICROALBUMIN/CREA RATIO [id number] CPT(R)] T4, FREE [id number] CPT(R)] TSH 3RD GENERATION [12235 CPT(R)] Follow-up Instructions Return for 11/28/17 at 9:30am.  
  
  
Patient Instructions 1) Your blood sugars look much better. Keep up the good work and we'll keep your doses of insulin the same. Remember when your sugar is 100 or less, eat first, but take 7 units, don't skip your dose. 2) Go to [facility] sometime in the next week in [place] and have your labs drawn and I'll send you a letter with the results. 3) Let us know when you are coming to see Dr. Dior Montes De Oca in November and we'll try to coordinate the visit. 4) I will send you a reminder letter 3-4 weeks prior to your next visit and you will have the order already in the [facility] system so you can just go sometime in the 3-7 days before the next visit to have your labs drawn. Introducing [facility] & HEALTH SERVICES! Basilio Figueredo introduces [employer] patient portal. Now you can access parts of your medical record, email your doctor's office, and request medication refills online. 1. In your internet browser, go to [email or web address] 2. Click on the First Time User? Click Here link in the Sign In box. You will see the New Member Sign Up page. 3. Enter your [employer] Access Code exactly as it appears below. You will not need to use this code after youve completed the sign-up process. If you do not sign up before the expiration date, you must request a new code. · [employer] Access Code: [email or web address] Expires: 10/16/2017 10:35 AM 
 
4. Enter the last four digits of your Social Security Number (xxxx) and Date of Birth (mm/dd/yyyy) as indicated and click Submit. You will be taken to the next sign-up page. 5. Create a [employer] ID. This will be your [employer] login ID and cannot be changed, so think of one that is secure and easy to remember. 6. Create a [employer] password. You can change your password at any time. 7. Enter your Password Reset Question and Answer. This can be used at a later time if you forget your password. 8. Enter your e-mail address. You will receive e-mail notification when new information is available in [address] 9. Click Sign Up. You can now view and download portions of your medical record. 10. Click the Download Summary menu link to download a portable copy of your medical information. If you have questions, please visit the Frequently Asked Questions section of the [employer] website. Remember, [employer] is NOT to be used for urgent needs. For medical emergencies, dial 911. Now available from your iPhone and Android! Please provide this summary of care documentation to your next provider. Your primary care clinician is listed as [place]. If you have any questions after today's visit, please call [phone].

## 2017-07-18 NOTE — PATIENT INSTRUCTIONS
1) Your blood sugars look much better. Keep up the good work and we'll keep your doses of insulin the same. Remember when your sugar is 100 or less, eat first, but take 7 units, don't skip your dose. 2) Go to [facility] sometime in the next week in [place] and have your labs drawn and I'll send you a letter with the results. 3) Let us know when you are coming to see Dr. Kelsie Chauhan in November and we'll try to coordinate the visit. 4) I will send you a reminder letter 3-4 weeks prior to your next visit and you will have the order already in the [facility] system so you can just go sometime in the 3-7 days before the next visit to have your labs drawn.

## 2017-07-18 NOTE — PROGRESS NOTES
Chief Complaint   Patient presents with    Diabetes     pcp and pharmacy confirmed     History of Present Illness: Melody Eduardo is a 68 y.o. female here for follow up of diabetes. Weight down 2 lbs since last visit in 7/17. She has been compliant with the toujeo and novolog scales since last visit. As long as she takes her doses on time, her blood sugars are very good between 100-180 but if she eats out or forgets her dose, can have readings in the 200-300 range a few times per week. Occ will have readings in the  range and tries to eat first and then take 7 units but sometimes forgets to take her dose and then can have a high sugar by the next check. Just saw Dr. Curtis Mondragon last week and plan is to resume octreotide monthly injections starting today for her neuroendocrine tumor. She will be receiving these through Dr. Adalberto Gil office. Her cholesterol was very elevated in May at Dr. Simin Barrera office for unclear reasons so will repeat this week. Compliant with BP regimen. Current Outpatient Prescriptions   Medication Sig    insulin glargine (TOUJEO SOLOSTAR) 300 unit/mL (1.5 mL) inpn 26 Units by SubCUTAneous route nightly. Dose change 2/9/17--updated med list--did not send prescription to the pharmacy    insulin aspart (NOVOLOG) 100 unit/mL inpn Inject 8 units before meals + 1 units for every 50 mg/dl above 180 mg/dl--may substitute humalog if preferred--Dose change 1/24/17--updated med list--did not send prescription to the pharmacy    Insulin Needles, Disposable, (BD INSULIN PEN NEEDLE UF MINI) 31 gauge x 3/16\" ndle Use as directed up to 6 times daily    amLODIPine (NORVASC) 10 mg tablet Take 1 Tab by mouth daily.  cloNIDine HCl (CATAPRES) 0.1 mg tablet TAKE 1/2 TABLET BY MOUTH TWICE A DAY only if diastolic BP > 273    ALPRAZolam (XANAX) 0.25 mg tablet Take 1 tab at bedtime    levothyroxine (SYNTHROID) 112 mcg tablet Take 112 mcg by mouth daily.  1 tab twice daily    ACCU-CHEK SMARTVIEW TEST STRIP strip Test 3 times daily    metoprolol (LOPRESSOR) 50 mg tablet Take 50 mg by mouth two (2) times a day.  metformin (GLUCOPHAGE) 1,000 mg tablet Take 500 mg by mouth two (2) times a day.  clindamycin (CLEOCIN) 300 mg capsule take 2 capsules by mouth 1 HOUR BEFORE DENTAL PROCEDURE     No current facility-administered medications for this visit. Allergies   Allergen Reactions    Demerol [Meperidine] Hives    Penicillins Hives    Percocet [Oxycodone-Acetaminophen] Other (comments)     hallucinating    Simvastatin Nausea Only     Review of Systems:  - Eyes: no blurry vision or double vision  - Cardiovascular: no chest pain  - Respiratory: no shortness of breath  - Musculoskeletal: no myalgias  - Neurological: no numbness/tingling in extremities    Physical Examination:  Blood pressure 140/80, pulse 68, height 5' 1\" (1.549 m), weight 154 lb 3.2 oz (69.9 kg). - General: pleasant, no distress, good eye contact   - Neck: no carotid bruits  - Cardiovascular: regular, normal rate, nl s1 and s2, no m/r/g,   - Respiratory: clear bilaterally  - Integumentary: no edema,   - Psychiatric: normal mood and affect    Data Reviewed: 5/10/17  - lipids: total 279 ,  HDL 85, ,   - ALT 63, AST 85  - BUN/Cr 27/0.93      Assessment/Plan:     1. Uncontrolled type 2 diabetes mellitus without complication, with long-term current use of insulin ([address]): her most recent Hgb A1c was 9% in 4/17 down from 10.6% in 12/16 up from 8.8% in 10/15. 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 Overall control is doing well at this time so won't make any changes. A reasonable goal A1c is 7.5-8%.   - cont metformin 1g 1/2 tab bid  - cont toujeo 26 units at bedtime  - cont novolog 8 units before each meal + 1 units for every 50 mg/dl above 180 mg/dl  - check bs 4 times daily due to fluctuating sugars  - foot exam done 12/16  - optho UTD 3/17  - check Hgb A1c, cmp, and microalbumin this week and prior to next visit        2. Essential hypertension, benign: her BP was just above goal < 140/90   - cont amlodipine 10 mg daily  - cont metoprolol 50 mg bid  - cont clonidine 0.1 mg 1/2 tab bid prn diastolic bp > 290      3. Hyperlipidemia with target LDL less than 100:  in 10/15 and 73 in 12/16. Up to 173 in 5/17 for unclear reasons so will repeat. - diet control for now  - check lipids this week     4. Postablative hypothyroidism: TSH was 0.91 in 10/15 on levothyroxine 112 mcg daily and 3.23 in 12/16  - cont levothyroxine 112 mcg daily  - check TSH and free T4 this week          Patient Instructions   1) Your blood sugars look much better. Keep up the good work and we'll keep your doses of insulin the same. Remember when your sugar is 100 or less, eat first, but take 7 units, don't skip your dose. 2) Go to [facility] sometime in the next week in [place] and have your labs drawn and I'll send you a letter with the results. 3) Let us know when you are coming to see Dr. Jia Black in November and we'll try to coordinate the visit. 4) I will send you a reminder letter 3-4 weeks prior to your next visit and you will have the order already in the [facility] system so you can just go sometime in the 3-7 days before the next visit to have your labs drawn.       Follow-up Disposition:  Return for 11/28/17 at 9:30am.    Copy sent to:  Dr. Mimi Oviedo    Lab follow up: 7/27/17    Sent her the following message in a letter and had [facility] call her with her lab results:    Resulted Orders   HEMOGLOBIN A1C WITH EAG   Result Value Ref Range    Hemoglobin A1c 7.8 (H) 4.8 - 5.6 %      Comment:               Pre-diabetes: 5.7 - 6.4           Diabetes: >6.4           Glycemic control for adults with diabetes: <7.0      Estimated average glucose 177 mg/dL    Narrative    Performed at:  [place] [address]  [address] [place], [place]  [id number]  : Brooke Salazar MD, Phone:  [phone]   LIPID PANEL   Result Value Ref Range    Cholesterol, total 210 (H) 100 - 199 mg/dL    Triglyceride 60 0 - 149 mg/dL    HDL Cholesterol 102 >39 mg/dL    VLDL, calculated 12 5 - 40 mg/dL    LDL, calculated 96 0 - 99 mg/dL    Narrative    Performed at:  [address]  [id number]  : Brooke Salzaar MD, Phone:  [phone]   METABOLIC PANEL, COMPREHENSIVE   Result Value Ref Range    Glucose 161 (H) 65 - 99 mg/dL    BUN 20 8 - 27 mg/dL    Creatinine 0.92 0.57 - 1.00 mg/dL    GFR est non-AA 60 >59 mL/min/1.73    GFR est AA 69 >59 mL/min/1.73    BUN/Creatinine ratio 22 12 - 28    Sodium 141 134 - 144 mmol/L    Potassium 4.7 3.5 - 5.2 mmol/L    Chloride 103 96 - 106 mmol/L    CO2 26 18 - 29 mmol/L    Calcium 9.3 8.7 - 10.3 mg/dL    Protein, total 6.3 6.0 - 8.5 g/dL    Albumin 3.2 (L) 3.5 - 4.8 g/dL    GLOBULIN, TOTAL 3.1 1.5 - 4.5 g/dL    A-G Ratio 1.0 (L) 1.2 - 2.2    Bilirubin, total 0.6 0.0 - 1.2 mg/dL    Alk.  phosphatase 402 (H) 39 - 117 IU/L    AST (SGOT) 125 (H) 0 - 40 IU/L    ALT (SGPT) 85 (H) 0 - 32 IU/L    Narrative    Performed at:  [address]  [id number]  : Brooke Salazar MD, Phone:  [phone]   T4, FREE   Result Value Ref Range    T4, Free 1.04 0.82 - 1.77 ng/dL    Narrative    Performed at:  [address]  [id number]  : Brooke Salazar MD, Phone:  [phone]   TSH 3RD GENERATION   Result Value Ref Range    TSH 1.440 0.450 - 4.500 uIU/mL    Narrative    Performed at:  [address]  [id number]  : Brooke Salazar MD, Phone:  [phone]   MICROALBUMIN, UR, RAND W/ MICROALBUMIN/CREA RATIO   Result Value Ref Range    Creatinine, urine 55.4 Not Estab. mg/dL    Microalbumin, urine 29.4 Not Estab. ug/mL Microalb/Creat ratio (ug/mg creat.) 53.1 (H) 0.0 - 30.0 mg/g creat    Narrative    Performed at:  [address]  [id number]  : Rolando Farrell MD, Phone:  [phone]   CVD REPORT   Result Value Ref Range    INTERPRETATION Note       Comment:      Supplement report is available. Narrative    Performed at:  [address]  [address] [place] [address]  [id number]  : Suzanne Matthews PhD, Phone:  [phone]   DIABETES PATIENT EDUCATION   Result Value Ref Range    PDF Image Not applicable     Narrative    Performed at:  [address]  [address] [place] [address]  [id number]  : Suzanne Matthews PhD, Phone:  [phone]       I wanted to update you on your recent lab results:    Hemoglobin A1c is a 3 month marker of your diabetes control. Goal is less than 7.5-8% which means your average blood sugar is less than 170-185. Your Hemoglobin A1c is 7.8% which means your diabetes is under better control than 9% at your last check and now is at goal.  Continue to work on your diet and exercise and take all your medications as directed.  -------------------------------------------------------------------------------------------------------------------  Total Cholesterol is the total number of cholesterol particles in your blood. Goal is less than 200. Your value is above goal but your HDL is so high that I'm not concerned. Triglycerides are the short term fats in your blood. Goal is less than 150. Your value is at goal.    HDL is the good cholesterol in your blood. Goal is more than 50. Your value is at goal.    LDL is the bad cholesterol in your blood. Goal is less than 100. Your value is at goal.    Continue to follow a low cholesterol diet.   Try to limit the amount of fried foods, fatty foods, butter, gravy, red meat, ice cream, cheese, and eggs in your diet, which are all high in cholesterol.   -------------------------------------------------------------------------------------------------------------------  BUN and creatinine are markers of kidney function. Your values are normal.  -------------------------------------------------------------------------------------------------------------------  ALT and AST are markers of liver function. Your values are abnormal due to your pancreatic tumor. -------------------------------------------------------------------------------------------------------------------  Microalbumin/creatinine ratio is a marker of the amount of protein in your urine. Goal is less than 30. Your value is slightly abnormal. This indicates that your kidneys are being slightly affected by your diabetes and/or blood pressure. To be safe, I would like to start a low dose of lisinopril 10 mg once daily in the morning to help protect your kidneys from the effects of diabetes and high blood pressure. This will be ready for  at the pharmacy today. Be aware of 2 side effects of this medication: 1) dry cough in 5-10%, 2) swelling of the lips/tongue (extremely rare, less than 0.5%, however it is an emergency and would require a visit to the ER)  -------------------------------------------------------------------------------------------------------------------  TSH is a thyroid test.  Your level is 1.44 which is normal and at goal of 0.5-2.0. This test goes opposite of your thyroid dose and suggests your dose of levothyroxine is perfect so I will keep your dose the same.

## 2017-07-26 ENCOUNTER — TELEPHONE (OUTPATIENT)
Dept: ENDOCRINOLOGY | Age: 77
End: 2017-07-26

## 2017-07-26 NOTE — TELEPHONE ENCOUNTER
Please call her and ask:    Have you gone yet to have your labs drawn as I haven't yet seen the results? If you have gone, can you let me know when you went so I can be on the lookout for the results? If you haven't gone, do you know when you plan to go? Thanks.

## 2017-07-27 ENCOUNTER — TELEPHONE (OUTPATIENT)
Dept: ENDOCRINOLOGY | Age: 77
End: 2017-07-27

## 2017-07-27 LAB
ALBUMIN SERPL-MCNC: 3.2 G/DL (ref 3.5–4.8)
ALBUMIN/CREAT UR: 53.1 MG/G CREAT (ref 0–30)
ALBUMIN/GLOB SERPL: 1 {RATIO} (ref 1.2–2.2)
ALP SERPL-CCNC: 402 IU/L (ref 39–117)
ALT SERPL-CCNC: 85 IU/L (ref 0–32)
AST SERPL-CCNC: 125 IU/L (ref 0–40)
BILIRUB SERPL-MCNC: 0.6 MG/DL (ref 0–1.2)
BUN SERPL-MCNC: 20 MG/DL (ref 8–27)
BUN/CREAT SERPL: 22 (ref 12–28)
CALCIUM SERPL-MCNC: 9.3 MG/DL (ref 8.7–10.3)
CHLORIDE SERPL-SCNC: 103 MMOL/L (ref 96–106)
CHOLEST SERPL-MCNC: 210 MG/DL (ref 100–199)
CO2 SERPL-SCNC: 26 MMOL/L (ref 18–29)
CREAT SERPL-MCNC: 0.92 MG/DL (ref 0.57–1)
CREAT UR-MCNC: 55.4 MG/DL
EST. AVERAGE GLUCOSE BLD GHB EST-MCNC: 177 MG/DL
GLOBULIN SER CALC-MCNC: 3.1 G/DL (ref 1.5–4.5)
GLUCOSE SERPL-MCNC: 161 MG/DL (ref 65–99)
HBA1C MFR BLD: 7.8 % (ref 4.8–5.6)
HDLC SERPL-MCNC: 102 MG/DL
INTERPRETATION, 910389: NORMAL
LDLC SERPL CALC-MCNC: 96 MG/DL (ref 0–99)
Lab: NORMAL
MICROALBUMIN UR-MCNC: 29.4 UG/ML
POTASSIUM SERPL-SCNC: 4.7 MMOL/L (ref 3.5–5.2)
PROT SERPL-MCNC: 6.3 G/DL (ref 6–8.5)
SODIUM SERPL-SCNC: 141 MMOL/L (ref 134–144)
T4 FREE SERPL-MCNC: 1.04 NG/DL (ref 0.82–1.77)
TRIGL SERPL-MCNC: 60 MG/DL (ref 0–149)
TSH SERPL DL<=0.005 MIU/L-ACNC: 1.44 UIU/ML (ref 0.45–4.5)
VLDLC SERPL CALC-MCNC: 12 MG/DL (ref 5–40)

## 2017-07-27 RX ORDER — LISINOPRIL 10 MG/1
10 TABLET ORAL DAILY
Qty: 90 TAB | Refills: 3 | Status: SHIPPED | OUTPATIENT
Start: 2017-07-27 | End: 2017-09-25 | Stop reason: SDUPTHER

## 2017-07-27 NOTE — TELEPHONE ENCOUNTER
Please call her with her lab results and let her know I sent her the following message in a lab letter:    Hemoglobin A1c is a 3 month marker of your diabetes control. Goal is less than 7.5-8% which means your average blood sugar is less than 170-185. Your Hemoglobin A1c is 7.8% which means your diabetes is under better control than 9% at your last check and now is at goal.  Continue to work on your diet and exercise and take all your medications as directed.  -------------------------------------------------------------------------------------------------------------------  Total Cholesterol is the total number of cholesterol particles in your blood. Goal is less than 200. Your value is above goal but your HDL is so high that I'm not concerned. Triglycerides are the short term fats in your blood. Goal is less than 150. Your value is at goal.    HDL is the good cholesterol in your blood. Goal is more than 50. Your value is at goal.    LDL is the bad cholesterol in your blood. Goal is less than 100. Your value is at goal.    Continue to follow a low cholesterol diet. Try to limit the amount of fried foods, fatty foods, butter, gravy, red meat, ice cream, cheese, and eggs in your diet, which are all high in cholesterol.   -------------------------------------------------------------------------------------------------------------------  BUN and creatinine are markers of kidney function. Your values are normal.  -------------------------------------------------------------------------------------------------------------------  ALT and AST are markers of liver function. Your values are abnormal due to your pancreatic tumor. -------------------------------------------------------------------------------------------------------------------  Microalbumin/creatinine ratio is a marker of the amount of protein in your urine. Goal is less than 30.   Your value is slightly abnormal. This indicates that your kidneys are being slightly affected by your diabetes and/or blood pressure. To be safe, I would like to start a low dose of lisinopril 10 mg once daily in the morning to help protect your kidneys from the effects of diabetes and high blood pressure. This will be ready for  at the pharmacy today. Be aware of 2 side effects of this medication: 1) dry cough in 5-10%, 2) swelling of the lips/tongue (extremely rare, less than 0.5%, however it is an emergency and would require a visit to the ER)  -------------------------------------------------------------------------------------------------------------------  TSH is a thyroid test.  Your level is 1.44 which is normal and at goal of 0.5-2.0. This test goes opposite of your thyroid dose and suggests your dose of levothyroxine is perfect so I will keep your dose the same.

## 2017-08-07 ENCOUNTER — TELEPHONE (OUTPATIENT)
Dept: ENDOCRINOLOGY | Age: 77
End: 2017-08-07

## 2017-08-07 NOTE — TELEPHONE ENCOUNTER
Patient stated that she has had two episodes where her sugar has been really low and she is taking her meds as directed.   .  8/1  107    8/2 173 ,131, 232,  238    8/3 155,  128,  229    8/4 143, 198, 185,  251    8/5 180, 59, 111    8/6 41 @ 3:00am 393 224 167 179    8/7 72,84

## 2017-08-07 NOTE — TELEPHONE ENCOUNTER
Patient is requesting a call back in regards to her sugars. She stated that they have been running low and she would like to know what she can do. She can be reached at [phone].

## 2017-09-25 ENCOUNTER — TELEPHONE (OUTPATIENT)
Dept: ENDOCRINOLOGY | Age: 77
End: 2017-09-25

## 2017-09-25 RX ORDER — LISINOPRIL 10 MG/1
TABLET ORAL
Qty: 90 TAB | Refills: 3
Start: 2017-09-25 | End: 2018-08-03 | Stop reason: SDUPTHER

## 2017-09-25 NOTE — TELEPHONE ENCOUNTER
Have her try taking just 1/2 of the 10 mg lisinopril tab to see if this helps with side effects. If still having the same symptoms after one week, then stop the medication and call me back.

## 2017-09-25 NOTE — TELEPHONE ENCOUNTER
Patient complains of decrease appetite, difficulty with sleeping and feeling nauseated. She stated that she feels that it is because of the lisinopril that she started taking on 7/27/2017. She stated that her daughter read  her the side effects of the lisinopril and that is why she thinks it is coming from the lisinopril.

## 2017-09-25 NOTE — TELEPHONE ENCOUNTER
Patient called to say that the Lisinopril is making her nauseated, tired all the time, and she can't sleep. She would like a call back please at:  [phone].

## 2017-10-20 RX ORDER — PEN NEEDLE, DIABETIC 31 GX3/16"
NEEDLE, DISPOSABLE MISCELLANEOUS
Qty: 200 PEN NEEDLE | Refills: 11 | Status: SHIPPED | OUTPATIENT
Start: 2017-10-20

## 2017-10-20 NOTE — TELEPHONE ENCOUNTER
Patient is requesting a refill on her pen needles. She stated that it can be sent to [facility] in [address] [place] [address]

## 2018-01-17 RX ORDER — INSULIN ASPART 100 [IU]/ML
INJECTION, SOLUTION INTRAVENOUS; SUBCUTANEOUS
Qty: 15 ML | Refills: 2 | Status: SHIPPED | OUTPATIENT
Start: 2018-01-17 | End: 2018-08-03 | Stop reason: SDUPTHER

## 2018-08-03 ENCOUNTER — OFFICE VISIT (OUTPATIENT)
Dept: ENDOCRINOLOGY | Age: 78
End: 2018-08-03

## 2018-08-03 VITALS
DIASTOLIC BLOOD PRESSURE: 80 MMHG | SYSTOLIC BLOOD PRESSURE: 128 MMHG | WEIGHT: 154.2 LBS | HEART RATE: 70 BPM | HEIGHT: 61 IN | BODY MASS INDEX: 29.11 KG/M2

## 2018-08-03 DIAGNOSIS — I10 ESSENTIAL HYPERTENSION, BENIGN: ICD-10-CM

## 2018-08-03 DIAGNOSIS — E89.0 POSTABLATIVE HYPOTHYROIDISM: ICD-10-CM

## 2018-08-03 DIAGNOSIS — E78.5 HYPERLIPIDEMIA WITH TARGET LDL LESS THAN 100: ICD-10-CM

## 2018-08-03 LAB — HBA1C MFR BLD HPLC: 8.3 %

## 2018-08-03 RX ORDER — PANCRELIPASE 15000; 3000; 9500 [USP'U]/1; [USP'U]/1; [USP'U]/1
CAPSULE, DELAYED RELEASE ORAL
Refills: 0 | COMMUNITY
Start: 2018-06-19

## 2018-08-03 RX ORDER — LATANOPROST 50 UG/ML
SOLUTION/ DROPS OPHTHALMIC
COMMUNITY
Start: 2018-05-31

## 2018-08-03 RX ORDER — INSULIN ASPART 100 [IU]/ML
INJECTION, SOLUTION INTRAVENOUS; SUBCUTANEOUS
Qty: 15 ML | Refills: 11 | Status: SHIPPED | OUTPATIENT
Start: 2018-08-03 | End: 2018-08-03 | Stop reason: SDUPTHER

## 2018-08-03 RX ORDER — INSULIN ASPART 100 [IU]/ML
INJECTION, SOLUTION INTRAVENOUS; SUBCUTANEOUS
Qty: 15 ML | Refills: 11 | Status: SHIPPED | OUTPATIENT
Start: 2018-08-03

## 2018-08-03 RX ORDER — LISINOPRIL 10 MG/1
TABLET ORAL
Qty: 90 TAB | Refills: 3 | Status: SHIPPED | OUTPATIENT
Start: 2018-08-03 | End: 2019-09-24 | Stop reason: SDUPTHER

## 2018-08-03 NOTE — PROGRESS NOTES
Chief Complaint Patient presents with  Diabetes  
  pcp and pharmacy confirmed History of Present Illness: Leo Lindquist is a 66 y.o. female here for follow up of diabetes. Weight stable since last visit in 7/17. I haven't seen her over the past year as she has been going to Dr. Donnie Stokes office on a frequent basis and needing monthly octreotide injections and CT scans. When I started her on the lisinopril at 10 mg daily she had called us in September about some nausea and dizziness and I recommended taking 1/2 tab daily and she did this for maybe 2 weeks and then went back to the whole tab daily and has felt fine on this ever since and stayed on a whole tab. She is able to now get her 2 insulins for $8 each per box. She was told that her sugar was elevated recently at Dr. Donnie Stokes office and to call and make an appointment so she called yesterday and got in off the cancellation list.  Review of her sugars checked 3-4 times per day over the past 90 days shows a lot of variability in her readings as she has been adjusting her toujeo from as little as 15 units when her bedtime sugar is 115 to as much as 26 units when her bedtime sugar is over 400. She is not taking any correction novolog at bedtime for fear of her sugar dropping overnight. Her daytime readings fluctuate between 69 to over 400 but most are in the 100-250 range. She has not been dosing any novolog when her sugar is under 100 and this can lead to a high sugar by the next check. Current Outpatient Prescriptions Medication Sig  
 lisinopril (PRINIVIL, ZESTRIL) 10 mg tablet Take 1 tab daily. For blood pressure and kidney protection  insulin aspart U-100 (NOVOLOG FLEXPEN U-100 INSULIN) 100 unit/mL inpn Inject 8 units before meals + 1 units for every 50 mg/dl above 180 mg/dl  insulin glargine (TOUJEO SOLOSTAR U-300 INSULIN) 300 unit/mL (1.5 mL) inpn 26 Units by SubCUTAneous route nightly.   
 CREON 3,000-9,500- 15,000 unit cpDR Take 3 caps tid with food  latanoprost (XALATAN) 0.005 % ophthalmic solution Use as directed  Insulin Needles, Disposable, (BD INSULIN PEN NEEDLE UF MINI) 31 gauge x 3/16\" ndle Use as directed up to 6 times daily  OCTREOTIDE ACETATE INJECTION by Injection route every month. Through Dr. Kiok Fry office starting 7/17  amLODIPine (NORVASC) 10 mg tablet Take 1 Tab by mouth daily.  cloNIDine HCl (CATAPRES) 0.1 mg tablet TAKE 1/2 TABLET BY MOUTH TWICE A DAY only if diastolic BP > 953  ALPRAZolam (XANAX) 0.25 mg tablet Take 1 tab at bedtime  levothyroxine (SYNTHROID) 112 mcg tablet Take 112 mcg by mouth daily. 1 tab twice daily  ACCU-CHEK SMARTVIEW TEST STRIP strip Test 3 times daily  metoprolol (LOPRESSOR) 50 mg tablet Take 50 mg by mouth two (2) times a day.  metformin (GLUCOPHAGE) 1,000 mg tablet Take 500 mg by mouth two (2) times a day.  clindamycin (CLEOCIN) 300 mg capsule take 2 capsules by mouth 1 HOUR BEFORE DENTAL PROCEDURE No current facility-administered medications for this visit. Allergies Allergen Reactions  Demerol [Meperidine] Hives  Penicillins Hives  Percocet [Oxycodone-Acetaminophen] Other (comments)  
  hallucinating  Simvastatin Nausea Only Review of Systems: - Eyes: no blurry vision or double vision - Cardiovascular: no chest pain - Respiratory: no shortness of breath - Musculoskeletal: no myalgias - Neurological: no numbness/tingling in extremities Physical Examination: 
Blood pressure 128/80, pulse 70, height 5' 1\" (1.549 m), weight 154 lb 3.2 oz (69.9 kg). - General: pleasant, no distress, good eye contact  
- Neck: no carotid bruits - Cardiovascular: regular, normal rate, nl s1 and s2, no m/r/g,  
- Respiratory: clear bilaterally - Integumentary: no edema,  
- Psychiatric: normal mood and affect Diabetic foot exam:  
 
Left Foot: 
 Visual Exam: callous - mild Pulse DP: 2+ (normal)  Filament test: normal sensation Vibratory sensation: diminished Right Foot: 
 Visual Exam: callous - mild Pulse DP: 2+ (normal) Filament test: normal sensation Vibratory sensation: diminished Data Reviewed:  
Component Latest Ref Rng & Units 8/3/2018  
 
      9:44 AM  
Hemoglobin A1c (POC) 
    % 8.3 Labs from June 2018: 
- TSH 1.74 
- BUN/Cr 22/1.05 
- ALT/AST 26/43 Assessment/Plan: 1. Uncontrolled type 2 diabetes mellitus without complication, with long-term current use of insulin ([facility] [address]): her most recent Hgb A1c was 8.3% in 8/18 up from 7.8% in 7/17 down from 9% in 4/17 down from 10.6% in 12/16 up from 8.8% in 10/15. 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 Overall control is more variable due to her changing her doses of toujeo and not taking novolog per the scale at times. I gave her a new detailed plan below. A reasonable goal A1c is 7.5-8% so she is just above goal. 
- cont metformin 1g 1/2 tab bid 
- decrease toujeo to 22 units at bedtime 
- decrease novolog to 7 units before each meal + 1 units for every 50 mg/dl above 180 mg/dl - check bs 4 times daily due to fluctuating sugars 
- foot exam done 12/16 
- optho UTD 3/17--going on 8/9/18 
- microalbumin 53.1 in 7/17 and started lisinopril 5 mg daily - check Hgb cmp, and microalbumin today 2. Essential hypertension, benign: her BP was at goal < 140/90  
- cont amlodipine 10 mg daily 
- cont metoprolol 50 mg bid 
- cont lisinopril 10 mg daily 
- cont clonidine 0.1 mg 1/2 tab bid prn diastolic bp > 758 
   
3. Hyperlipidemia with target LDL less than 100:  in 10/15 and 73 in 12/16. Up to 173 in 5/17 for unclear reasons so repeated and down to 96 in 7/17. 
- diet control for now - check lipids today 4.  Postablative hypothyroidism: TSH was 0.91 in 10/15 on levothyroxine 112 mcg daily and 3.23 in 12/16 and 1.44 in 7/17 and 1.74 in 6/18. 
- cont levothyroxine 112 mcg daily - check TSH and free T4 in 6/19 We spent 40 minutes of face to face time together and > 50% of the time was spent in counseling regarding management of all the conditions above. Patient Instructions 1) Continue toujeo (light gray/green pen) but take 22 units at bedtime. This is a long acting insulin. You will take this everyday regardless of your blood sugar. DO NOT ADJUST THIS DOSE!! 
 
2) Take novolog (dark blue/orange pen) 5-10 minutes before you eat during the day your meal based on your blood sugar as listed on the scale below:  I updated this scale so you don't need to take more than what's on this scale. Blood sugar Under 80  Eat first, take 5 units    Eat first, take 6 units 100-180  7 units 181-230  8 units 231-280  9 units 281-330  10 units 331-380  11 units 381-430  12 units 431-480  13 units 481-530  14 units 3) If you check your sugar at bedtime and your sugar is over 180, take the 22 units of toujeo PLUS a dose of novolog based on the scale below. 181-230  1 units 231-280  2 units 281-330  3 units 331-380  4 units 381-430  5 units 431-480  6 units 481-530  7 units 4) If you don't take the novolog before you eat, and it's been within 2 hours of the meal, check your sugar when you get home and take a dose of novolog based on the EATING scale but TAKE 2 UNITS LESS. If it's time for the next meal, just take your dose based on what the scale says. 5) I will be in touch with your labs the week of August 13th. Follow-up Disposition: 
Return in about 4 months (around 12/3/2018). Copy sent to: 
Dr. Navya Ba Lab follow up: 8/14/18 Sent her the following message in a letter: 
 
MICROALBUMIN, UR, RAND W/ MICROALB/CREAT RATIO Result Value Ref Range Creatinine, urine 105.8 Not Estab. mg/dL Microalbumin, urine 34.2 Not Estab. ug/mL Microalb/Creat ratio (ug/mg creat.) 32.3 (H) 0.0 - 30.0 mg/g creat Narrative Performed at:  [address]  [id number] : Reji Winn MD, Phone:  [phone] METABOLIC PANEL, COMPREHENSIVE Result Value Ref Range Glucose 109 (H) 65 - 99 mg/dL BUN 22 8 - 27 mg/dL Creatinine 1.14 (H) 0.57 - 1.00 mg/dL GFR est non-AA 46 (L) >59 mL/min/1.73 GFR est AA 53 (L) >59 mL/min/1.73  
 BUN/Creatinine ratio 19 12 - 28 Sodium 141 134 - 144 mmol/L Potassium 4.9 3.5 - 5.2 mmol/L Chloride 106 96 - 106 mmol/L  
 CO2 21 20 - 29 mmol/L Calcium 8.9 8.7 - 10.3 mg/dL Protein, total 7.0 6.0 - 8.5 g/dL Albumin 3.3 (L) 3.5 - 4.8 g/dL GLOBULIN, TOTAL 3.7 1.5 - 4.5 g/dL A-G Ratio 0.9 (L) 1.2 - 2.2 Bilirubin, total 0.3 0.0 - 1.2 mg/dL Alk. phosphatase 261 (H) 39 - 117 IU/L  
 AST (SGOT) 35 0 - 40 IU/L  
 ALT (SGPT) 23 0 - 32 IU/L Narrative Performed at:  [address]  [id number] : Reji Winn MD, Phone:  [phone] LIPID PANEL Result Value Ref Range Cholesterol, total 207 (H) 100 - 199 mg/dL Triglyceride 82 0 - 149 mg/dL HDL Cholesterol 83 >39 mg/dL VLDL, calculated 16 5 - 40 mg/dL LDL, calculated 108 (H) 0 - 99 mg/dL Narrative Performed at:  [address]  [id number] : Reji Winn MD, Phone:  [phone] I wanted to update you on your recent lab results: 
 
------------------------------------------------------------------------------------------------------------------- Total Cholesterol is the total number of cholesterol particles in your blood. Goal is less than 200. Your value is just above goal. 
 
Triglycerides are the short term fats in your blood. Goal is less than 150. Your value is at goal. 
 
HDL is the good cholesterol in your blood. Goal is more than 50.   Your value is at goal. 
 
LDL is the bad cholesterol in your blood. Goal is less than 100. Your value is just above goal. 
 
Continue to follow a low cholesterol diet. Try to limit the amount of fried foods, fatty foods, butter, gravy, red meat, ice cream, cheese, and eggs in your diet, which are all high in cholesterol. 
------------------------------------------------------------------------------------------------------------------- 
BUN and creatinine are markers of kidney function. Your creatinine is just slightly abnormal.  Drink at least 4 8oz glasses of water everyday to stay hydrated to prevent your creatinine level from going higher. ------------------------------------------------------------------------------------------------------------------- 
ALT and AST are markers of liver function. Your values are normal. 
------------------------------------------------------------------------------------------------------------------- Microalbumin/creatinine ratio is a marker of the amount of protein in your urine. Goal is less than 30. Your value is just barely abnormal but improved from 53 in July 2017 with starting lisinopril. This indicates that your kidneys are being less affected by your uncontrolled diabetes and/or blood pressure. Continue to take lisinopril to help protect your kidneys from the effects of diabetes and high blood pressure.

## 2018-08-03 NOTE — MR AVS SNAPSHOT
[address] [facility] [address] [id number] [phone] Patient: Elizabeth Finnegan MRN: [id number] VOO:[birth year] Visit Information Date & Time Provider Department Dept. Phone Encounter #  
 8/3/2018  9:10 AM Yassine Cifuentes, [address] [facility] Diabetes and Endocrinology [phone] Follow-up Instructions Return in about 4 months (around 12/3/2018). Follow-up and Disposition History Upcoming Health Maintenance Date Due DTaP/Tdap/Td series (1 - Tdap) 7/4/1961 ZOSTER VACCINE AGE 60> 5/4/2000 Bone Densitometry (Dexa) Screening 7/4/2005 Pneumococcal 65+ Low/Medium Risk (2 of 2 - PPSV23) 9/2/2015 FOOT EXAM Q1 12/8/2017 HEMOGLOBIN A1C Q6M 1/26/2018 MEDICARE YEARLY EXAM 3/14/2018 EYE EXAM RETINAL OR DILATED Q1 3/23/2018 MICROALBUMIN Q1 7/26/2018 LIPID PANEL Q1 7/26/2018 Influenza Age 5 to Adult 8/1/2018 GLAUCOMA SCREENING Q2Y 3/23/2019 Allergies as of 8/3/2018  Review Complete On: 8/3/2018 By: Yassine Cifuentes MD  
  
 Severity Noted Reaction Type Reactions Demerol [Meperidine] Medium 09/02/2010   Topical Hives Penicillins Medium 09/02/2010   Topical Hives Percocet [Oxycodone-acetaminophen]  12/08/2016    Other (comments)  
 hallucinating Simvastatin  01/05/2011    Nausea Only Current Immunizations  Never Reviewed Name Date ZZZ-RETIRED (DO NOT USE) Pneumococcal Vaccine (Unspecified Type) 9/2/2010  3:41 PM  
  
 Not reviewed this visit You Were Diagnosed With   
  
 Codes Comments Uncontrolled type 2 diabetes mellitus without complication, with long-term current use of insulin ([facility]    -  Primary ICD-10-CM: E11.65, Z79.4 ICD-9-CM: 250.02, V58.67 Hyperlipidemia with target LDL less than 100     ICD-10-CM: E78.5 ICD-9-CM: 272.4 Postablative hypothyroidism     ICD-10-CM: E89.0 ICD-9-CM: 244.1 Essential hypertension, benign     ICD-10-CM: I10 
ICD-9-CM: 401.1 Vitals BP Pulse Height(growth percentile) Weight(growth percentile) BMI Smoking Status 128/80 70 5' 1\" (1.549 m) 154 lb 3.2 oz (69.9 kg) 29.14 kg/m2 Former Smoker Vitals History BMI and BSA Data Body Mass Index Body Surface Area  
 29.14 kg/m 2 1.73 m 2 Preferred Pharmacy Pharm[facility] Name Phone [facility] [phone] [facility] [phone] Your Updated Medication List  
  
   
This list is accurate as of 8/3/18 10:12 AM.  Always use your most recent med list.  
  
  
  
  
 ACCU-CHEK SMARTVIEW TEST STRIP strip Generic drug:  glucose blood VI test strips Test 3 times daily ALPRAZolam 0.25 mg tablet Commonly known as:  Mosetta Harp Take 1 tab at bedtime  
  
 amLODIPine 10 mg tablet Commonly known as:  Hermniia Citron Take 1 Tab by mouth daily. clindamycin 300 mg capsule Commonly known as:  CLEOCIN  
take 2 capsules by mouth 1 HOUR BEFORE DENTAL PROCEDURE  
  
 cloNIDine HCl 0.1 mg tablet Commonly known as:  CATAPRES  
TAKE 1/2 TABLET BY MOUTH TWICE A DAY only if diastolic BP > 634 CREON 3,000-9,500- 15,000 unit Cpdr  
Generic drug:  lipase-protease-amylase Take 3 caps tid with food  
  
 insulin aspart U-100 100 unit/mL Inpn Commonly known as:  NovoLOG Flexpen U-100 Insulin Inject 7 units before meals + 1 units for every 50 mg/dl above 180 mg/dl--New LOWER dose replaces prior script on file  
  
 insulin glargine 300 unit/mL (1.5 mL) Inpn Commonly known as:  TOUJEO SOLOSTAR U-300 INSULIN  
22 Units by SubCUTAneous route nightly. New LOWER dose replaces prior script on file Insulin Needles (Disposable) 31 gauge x 3/16\" Ndle Commonly known as:  BD ULTRA-FINE MINI PEN NEEDLE Use as directed up to 6 times daily  
  
 latanoprost 0.005 % ophthalmic solution Commonly known as:  Leonard Milford Use as directed  
  
 levothyroxine 112 mcg tablet Commonly known as:  SYNTHROID Take 112 mcg by mouth daily. 1 tab twice daily  
  
 lisinopril 10 mg tablet Commonly known as:  Pepe Saint Thomas Take 1 tab daily. For blood pressure and kidney protection  
  
 metFORMIN 1,000 mg tablet Commonly known as:  GLUCOPHAGE Take 500 mg by mouth two (2) times a day. metoprolol tartrate 50 mg tablet Commonly known as:  LOPRESSOR Take 50 mg by mouth two (2) times a day. OCTREOTIDE ACETATE INJECTION  
by Injection route every month. Through Dr. Jersey Mccall office starting  Prescriptions Sent to Pharmacy Refills  
 lisinopril (PRINIVIL, ZESTRIL) 10 mg tablet 3 Sig: Take 1 tab daily. For blood pressure and kidney protection Class: Normal  
 Pharmacy: [facility] [id number] [address] Ph #: [phone]  
 insulin aspart U-100 (NOVOLOG FLEXPEN U-100 INSULIN) 100 unit/mL inpn 11 Sig: Inject 7 units before meals + 1 units for every 50 mg/dl above 180 mg/dl--New LOWER dose replaces prior script on file Class: Normal  
 Pharmacy: [facility] [id number] [address] Ph #: [phone]  
 insulin glargine (TOUJEO SOLOSTAR U-300 INSULIN) 300 unit/mL (1.5 mL) inpn 11 Si Units by SubCUTAneous route nightly. New LOWER dose replaces prior script on file Class: Normal  
 Pharmacy: [facility] [id number] [address] Ph #: [phone] Route: SubCUTAneous We Performed the Following AMB POC HEMOGLOBIN A1C [23284 CPT(R)] COLLECTION VENOUS BLOOD,VENIPUNCTURE [id number] CPT(R)] LIPID PANEL [77938 CPT(R)] METABOLIC PANEL, COMPREHENSIVE [14377 CPT(R)] MICROALBUMIN, UR, RAND W/ MICROALB/CREAT RATIO [id number] CPT(R)] [place] HANDLG&/OR CONVEY OF SPEC FOR TR OFFICE TO LAB [93078 CPT(R)] Follow-up Instructions Return in about 4 months (around 12/3/2018). Patient Instructions 1) Continue toujeo (light gray/green pen) but take 22 units at bedtime. This is a long acting insulin. You will take this everyday regardless of your blood sugar. DO NOT ADJUST THIS DOSE!! 
 
2) Take novolog (dark blue/orange pen) 5-10 minutes before you eat during the day your meal based on your blood sugar as listed on the scale below:  I updated this scale so you don't need to take more than what's on this scale. Blood sugar Under 80  Eat first, take 5 units    Eat first, take 6 units 100-180  7 units 181-230  8 units 231-280  9 units 281-330  10 units 331-380  11 units 381-430  12 units 431-480  13 units 481-530  14 units 3) If you check your sugar at bedtime and your sugar is over 180, take the 22 units of toujeo PLUS a dose of novolog based on the scale below. 181-230  1 units 231-280  2 units 281-330  3 units 331-380  4 units 381-430  5 units 431-480  6 units 481-530  7 units 4) If you don't take the novolog before you eat, and it's been within 2 hours of the meal, check your sugar when you get home and take a dose of novolog based on the EATING scale but TAKE 2 UNITS LESS. If it's time for the next meal, just take your dose based on what the scale says. 5) I will be in touch with your labs the week of August 13th. Patient Instructions History Introducing [facility]! [employer] introduces [employer] patient portal. Now you can access parts of your medical record, email your doctor's office, and request medication refills online. 1. In your internet browser, go to [email or web address]. [email or web address] 2. Click on the First Time User? Click Here link in the Sign In box. You will see the New Member Sign Up page. 3. Enter your [employer] Access Code exactly as it appears below. You will not need to use this code after youve completed the sign-up process.  If you do not sign up before the expiration date, you must request a new code. · [employer] Access Code: [email or web address] Expires: 11/1/2018 10:01 AM 
 
4. Enter the last four digits of your Social Security Number (xxxx) and Date of Birth (mm/dd/yyyy) as indicated and click Submit. You will be taken to the next sign-up page. 5. Create a [employer] ID. This will be your [employer] login ID and cannot be changed, so think of one that is secure and easy to remember. 6. Create a [employer] password. You can change your password at any time. 7. Enter your Password Reset Question and Answer. This can be used at a later time if you forget your password. 8. Enter your e-mail address. You will receive e-mail notification when new information is available in [address] 9. Click Sign Up. You can now view and download portions of your medical record. 10. Click the Download Summary menu link to download a portable copy of your medical information. If you have questions, please visit the Frequently Asked Questions section of the [employer] website. Remember, [employer] is NOT to be used for urgent needs. For medical emergencies, dial 911. Now available from your iPhone and Android! Please provide this summary of care documentation to your next provider. Your primary care clinician is listed as Rand Kent. If you have any questions after today's visit, please call [phone].

## 2018-08-03 NOTE — PATIENT INSTRUCTIONS
1) Continue toujeo (light gray/green pen) but take 22 units at bedtime. This is a long acting insulin. You will take this everyday regardless of your blood sugar. DO NOT ADJUST THIS DOSE!! 
 
2) Take novolog (dark blue/orange pen) 5-10 minutes before you eat during the day your meal based on your blood sugar as listed on the scale below:  I updated this scale so you don't need to take more than what's on this scale. Blood sugar Under 80  Eat first, take 5 units    Eat first, take 6 units 100-180  7 units 181-230  8 units 231-280  9 units 281-330  10 units 331-380  11 units 381-430  12 units 431-480  13 units 481-530  14 units 3) If you check your sugar at bedtime and your sugar is over 180, take the 22 units of toujeo PLUS a dose of novolog based on the scale below. 181-230  1 units 231-280  2 units 281-330  3 units 331-380  4 units 381-430  5 units 431-480  6 units 481-530  7 units 4) If you don't take the novolog before you eat, and it's been within 2 hours of the meal, check your sugar when you get home and take a dose of novolog based on the EATING scale but TAKE 2 UNITS LESS. If it's time for the next meal, just take your dose based on what the scale says. 5) I will be in touch with your labs the week of August 13th.

## 2018-08-04 LAB
ALBUMIN SERPL-MCNC: 3.3 G/DL (ref 3.5–4.8)
ALBUMIN/CREAT UR: 32.3 MG/G CREAT (ref 0–30)
ALBUMIN/GLOB SERPL: 0.9 {RATIO} (ref 1.2–2.2)
ALP SERPL-CCNC: 261 IU/L (ref 39–117)
ALT SERPL-CCNC: 23 IU/L (ref 0–32)
AST SERPL-CCNC: 35 IU/L (ref 0–40)
BILIRUB SERPL-MCNC: 0.3 MG/DL (ref 0–1.2)
BUN SERPL-MCNC: 22 MG/DL (ref 8–27)
BUN/CREAT SERPL: 19 (ref 12–28)
CALCIUM SERPL-MCNC: 8.9 MG/DL (ref 8.7–10.3)
CHLORIDE SERPL-SCNC: 106 MMOL/L (ref 96–106)
CHOLEST SERPL-MCNC: 207 MG/DL (ref 100–199)
CO2 SERPL-SCNC: 21 MMOL/L (ref 20–29)
CREAT SERPL-MCNC: 1.14 MG/DL (ref 0.57–1)
CREAT UR-MCNC: 105.8 MG/DL
GLOBULIN SER CALC-MCNC: 3.7 G/DL (ref 1.5–4.5)
GLUCOSE SERPL-MCNC: 109 MG/DL (ref 65–99)
HDLC SERPL-MCNC: 83 MG/DL
INTERPRETATION, 910389: NORMAL
INTERPRETATION: NORMAL
LDLC SERPL CALC-MCNC: 108 MG/DL (ref 0–99)
Lab: NORMAL
MICROALBUMIN UR-MCNC: 34.2 UG/ML
PDF IMAGE, 910387: NORMAL
POTASSIUM SERPL-SCNC: 4.9 MMOL/L (ref 3.5–5.2)
PROT SERPL-MCNC: 7 G/DL (ref 6–8.5)
SODIUM SERPL-SCNC: 141 MMOL/L (ref 134–144)
TRIGL SERPL-MCNC: 82 MG/DL (ref 0–149)
VLDLC SERPL CALC-MCNC: 16 MG/DL (ref 5–40)

## 2018-08-14 PROBLEM — E11.21 TYPE 2 DIABETES WITH NEPHROPATHY (HCC): Status: ACTIVE | Noted: 2018-08-14

## 2018-12-03 ENCOUNTER — TELEPHONE (OUTPATIENT)
Dept: ENDOCRINOLOGY | Age: 78
End: 2018-12-03

## 2018-12-03 NOTE — TELEPHONE ENCOUNTER
Please let her know I would like her to be seen sooner than that. She can come on the next available 12:10pm slot. Thanks.

## 2018-12-03 NOTE — TELEPHONE ENCOUNTER
Patient had to cancel her appointment with you this afternoon due to an emergency that she has to tend to. She stated that she can fax you her blood sugar readings if you would like. I informed patient that the next available is in March and she was ok with that as long as it was ok with you for her to wait that long. Please advise, thank you.

## 2019-09-24 RX ORDER — LISINOPRIL 10 MG/1
TABLET ORAL
Qty: 30 TAB | Refills: 0 | Status: SHIPPED | OUTPATIENT
Start: 2019-09-24

## 2019-10-03 ENCOUNTER — TELEPHONE (OUTPATIENT)
Dept: ENDOCRINOLOGY | Age: 79
End: 2019-10-03

## 2019-10-03 NOTE — TELEPHONE ENCOUNTER
Please call her and find out if she plans on coming back to see me or if she is just having her diabetes managed by Dr. Kristal Bustamante. If she will be coming back, then I will refill her insulin but if not, she should contact Dr. Kristal Bustamante for refills. Thanks.

## 2019-10-04 RX ORDER — INSULIN ASPART 100 [IU]/ML
INJECTION, SOLUTION INTRAVENOUS; SUBCUTANEOUS
Qty: 15 ML | Refills: 11 | OUTPATIENT
Start: 2019-10-04

## 2019-10-04 RX ORDER — INSULIN GLARGINE 300 U/ML
INJECTION, SOLUTION SUBCUTANEOUS
Qty: 4.5 ML | Refills: 11 | OUTPATIENT
Start: 2019-10-04

## 2019-10-04 NOTE — TELEPHONE ENCOUNTER
Spoke with pt and she stated that she is in hospice right now so she does not think she will be returning. Pt was then encouraged to reach out to Dr Kristal Bustamante for refills on her dm medications. Pt voiced understanding.

## 2023-09-17 NOTE — TELEPHONE ENCOUNTER
Problem: Adult Inpatient Plan of Care  Goal: Plan of Care Review  Outcome: Met  Goal: Patient-Specific Goal (Individualized)  Outcome: Met  Goal: Absence of Hospital-Acquired Illness or Injury  Outcome: Met  Goal: Optimal Comfort and Wellbeing  Outcome: Met  Goal: Readiness for Transition of Care  Outcome: Met      ----- Message from Nelda Alan MD sent at 1/5/2017  1:05 PM EST -----  Please call her and let her know I forgot to tell her that I sent a refill for the amlodipine to her pharmacy. However, I increased the dose to 10 mg once daily to try and give her better control of her blood pressure.  -----    Pt notified of above note per Dr. Yannick Pdeerson and voiced understanding of what was read to her.

## 2025-03-28 NOTE — TELEPHONE ENCOUNTER
Based on the consult note I would follow their guidance and proceed   Given that there doesn't seem to be a pattern to her low sugars, I wonder if she didn't eat as much for the meal prior to having the low. I won't make any changes to her doses but if she doesn't plan to eat as much starch as she normally does for a meal, she can take 1 unit less novolog for that meal from what the scale says to try and avoid a low sugar.